# Patient Record
Sex: FEMALE | Race: WHITE | URBAN - METROPOLITAN AREA
[De-identification: names, ages, dates, MRNs, and addresses within clinical notes are randomized per-mention and may not be internally consistent; named-entity substitution may affect disease eponyms.]

---

## 2017-01-01 ENCOUNTER — OFFICE VISIT (OUTPATIENT)
Dept: OPHTHALMOLOGY | Facility: CLINIC | Age: 47
End: 2017-01-01
Attending: OPHTHALMOLOGY
Payer: MEDICARE

## 2017-01-01 DIAGNOSIS — H47.20 OPTIC ATROPHY: ICD-10-CM

## 2017-01-01 DIAGNOSIS — H04.123 TEAR FILM INSUFFICIENCY, BILATERAL: ICD-10-CM

## 2017-01-01 DIAGNOSIS — H25.013 CORTICAL SENILE CATARACT, BILATERAL: ICD-10-CM

## 2017-01-01 PROCEDURE — 99214 OFFICE O/P EST MOD 30 MIN: CPT | Mod: ZF

## 2017-01-01 ASSESSMENT — SLIT LAMP EXAM - LIDS
COMMENTS: NORMAL
COMMENTS: NORMAL

## 2017-01-01 ASSESSMENT — REFRACTION_WEARINGRX
SPECS_TYPE: SV
OS_SPHERE: -2.00
OD_AXIS: 10
OS_AXIS: 175
OD_CYLINDER: +0.75
OS_CYLINDER: +1.00
OD_SPHERE: -1.50

## 2017-01-01 ASSESSMENT — VISUAL ACUITY
CORRECTION_TYPE: GLASSES
METHOD: SNELLEN - LINEAR
OD_CC: 20/50
OS_CC: 20/40

## 2017-01-01 ASSESSMENT — CONF VISUAL FIELD
OS_NORMAL: 1
OD_NORMAL: 1

## 2017-01-01 ASSESSMENT — TONOMETRY
OD_IOP_MMHG: 17
IOP_METHOD: TONOPEN
OS_IOP_MMHG: 19

## 2017-01-01 ASSESSMENT — CUP TO DISC RATIO
OD_RATIO: 0.4
OS_RATIO: 0.4

## 2017-01-01 ASSESSMENT — EXTERNAL EXAM - RIGHT EYE: OD_EXAM: NORMAL

## 2017-01-01 ASSESSMENT — EXTERNAL EXAM - LEFT EYE: OS_EXAM: NORMAL

## 2017-06-22 NOTE — PROGRESS NOTES
Assessment & Plan     Ada Carey is a 46 year old female with the following diagnoses:   1. Optic atrophy - Both Eyes    2. Cortical senile cataract, bilateral - Both Eyes    3. Tear film insufficiency, bilateral - Both Eyes       Follow up optic atrophy both eyes.  This is stable on retinal nerve fiber layer.  Her visual acuity is down from baseline of 20/30 both eyes in 2015. This is most likely from the Posterior subcapsular cataract (PSC).  Would ask her to consider Cataract extraction.  Need to discuss with her POA, her mom.           Attending Physician Attestation:  Complete documentation of historical and exam elements from today's encounter can be found in the full encounter summary report (not reduplicated in this progress note).  I personally obtained the chief complaint(s) and history of present illness.  I confirmed and edited as necessary the review of systems, past medical/surgical history, family history, social history, and examination findings as documented by others; and I examined the patient myself.  I personally reviewed the relevant tests, images, and reports as documented above.  I formulated and edited as necessary the assessment and plan and discussed the findings and management plan with the patient and family. - Shivam Villaseñor MD

## 2017-06-22 NOTE — NURSING NOTE
Chief Complaints and History of Present Illnesses   Patient presents with     Neurologic Problem     reduced vision     HPI    Symptoms:              Comments:  Ada Carey is a 45 year old female with the following diagnoses:   1. Cortical senile cataract, bilateral - Both Eyes   2. Optic atrophy - Both Eyes   3. Tear film insufficiency, bilateral - Both Eyes     Feels right vision is worse/more cloudy. Wondering if cataract surgery is indicated.     Nikia BARRIENTOS 10:38 AM June 22, 2017

## 2017-06-22 NOTE — MR AVS SNAPSHOT
After Visit Summary   2017    Ada Carey    MRN: [id number]           Patient Information     Date Of Birth          1970        Visit Information        Provider Department      2017 10:30 AM [facility]iv[facility]        Today's Diagnoses     Optic atrophy - Both Eyes        Cortical senile cataract, bilateral - Both Eyes        Tear film insufficiency, bilateral - Both Eyes           Follow-ups after your visit        Who to contact     Please call your clinic at [phone] to:    Ask questions about your health    Make or cancel appointments    Discuss your medicines    Learn about your test results    Speak to your doctor   If you have compliments or concerns about an experience at your clinic, or if you wish to file a complaint, please contact [facility] Patient Relations at [phone] or email us at [email or web address]         Additional Information About Your Visit        MyChart Information     [employer] is an electronic gateway that provides easy, online access to your medical records. With [employer], you can request a clinic appointment, read your test results, renew a prescription or communicate with your care team.     To sign up for [employer] visit the website at [email or web address]   You will be asked to enter the access code listed below, as well as some personal information. Please follow the directions to create your username and password.     Your access code is: [email or web address]  Expires: 2017  6:31 AM     Your access code will  in 90 days. If you need help or a new code, please contact your [facility] or call [phone] for assistance.        Care EveryWhere ID     This is your Care EveryWhere ID. This could be used by other organizations to access your [place] medical records  [id number]         Blood Pressure from Last 3 Encounters:   No data found for BP     Weight from Last 3 Encounters:   No data found for Wt              We Performed the Following     DILATED FUNDUS EXAM     IOP Measurement     OCT Optic Nerve RNFL Spectralis OU (both eyes)        Primary Care Provider Office Phone # Fax #    Shanta Ronquillo [phone]       [facility] [address] [place] [address]        Equal Access to Services     [facility]ilion[facility]ON[facility] : Hadii aad ku hadasho Soomaali, waaxda luqadaha, qaybta kaalmada adeegyada, waxay idiin hayaan adeeg flakito lapollyn . So [facility] [phone].    ATENCIÓN: Si habla español, tiene a barrios disposición servicios gratuitos de asistencia lingüística. [facility] [phone].    We comply with applicable federal civil rights laws and [place] laws. We do not discriminate on the basis of race, color, national origin, age, disability sex, sexual orientation or gender identity.            Thank you!     Thank you for choosing [facility]  for your care. Our goal is always to provide you with excellent care. Hearing back from our patients is one way we can continue to improve our services. Please take a few minutes to complete the written survey that you may receive in the mail after your visit with us. Thank you!             Your Updated Medication List - Protect others around you: Learn how to safely use, store and throw away your medicines at www.disposemymeds.org.          This list is accurate as of: 6/22/17 11:44 AM.  Always use your most recent med list.                   Brand Name Dispense Instructions for use Diagnosis    ABILIFY PO           AMOXICILLIN PO      Prior to dental procedures        calcium carbonate 1250 MG tablet    OS-DANNI 500 mg [place]. [place]     Take 500 mg by mouth 2 times daily        divalproex 500 MG 24 hr tablet    DEPAKOTE ER     Take 500 mg by mouth daily        LAC-HYDRIN 12 % cream   Generic drug:  ammonium lactate      Apply topically 2 times daily        MARLISSA 0.15-30 MG-MCG per tablet   Generic drug:   levonorgestrel-ethinyl estradiol      Take 1 tablet by mouth daily        MULTIVITAMIN/IRON PO           OMEGA-3 FISH OIL PO           oxybutynin 10 MG 24 hr tablet    DITROPAN-XL     Take by mouth daily        SEROQUEL PO      Take 100 mg by mouth daily